# Patient Record
Sex: MALE | Race: WHITE | Employment: FULL TIME | URBAN - NONMETROPOLITAN AREA
[De-identification: names, ages, dates, MRNs, and addresses within clinical notes are randomized per-mention and may not be internally consistent; named-entity substitution may affect disease eponyms.]

---

## 2020-07-08 ENCOUNTER — OFFICE VISIT (OUTPATIENT)
Dept: PHYSICAL MEDICINE AND REHAB | Age: 65
End: 2020-07-08
Payer: MEDICARE

## 2020-07-08 VITALS
WEIGHT: 263 LBS | TEMPERATURE: 98.1 F | HEART RATE: 66 BPM | DIASTOLIC BLOOD PRESSURE: 78 MMHG | BODY MASS INDEX: 35.62 KG/M2 | SYSTOLIC BLOOD PRESSURE: 142 MMHG | HEIGHT: 72 IN

## 2020-07-08 PROCEDURE — G8417 CALC BMI ABV UP PARAM F/U: HCPCS | Performed by: PAIN MEDICINE

## 2020-07-08 PROCEDURE — G8427 DOCREV CUR MEDS BY ELIG CLIN: HCPCS | Performed by: PAIN MEDICINE

## 2020-07-08 PROCEDURE — 99203 OFFICE O/P NEW LOW 30 MIN: CPT | Performed by: PAIN MEDICINE

## 2020-07-08 RX ORDER — SILDENAFIL 100 MG/1
100 TABLET, FILM COATED ORAL DAILY PRN
COMMUNITY
Start: 2020-04-29

## 2020-07-08 RX ORDER — TAMSULOSIN HYDROCHLORIDE 0.4 MG/1
0.4 CAPSULE ORAL DAILY
COMMUNITY
Start: 2020-04-14

## 2020-07-08 RX ORDER — CELECOXIB 200 MG/1
CAPSULE ORAL
COMMUNITY
Start: 2020-05-01

## 2020-07-08 RX ORDER — TRAMADOL HYDROCHLORIDE 50 MG/1
50 TABLET ORAL EVERY 6 HOURS PRN
COMMUNITY
Start: 2020-04-14

## 2020-07-08 ASSESSMENT — ENCOUNTER SYMPTOMS
VOMITING: 0
COLOR CHANGE: 0
COUGH: 0
BACK PAIN: 1
SINUS PRESSURE: 0
CONSTIPATION: 0
RHINORRHEA: 0
NAUSEA: 0
BOWEL INCONTINENCE: 0
PHOTOPHOBIA: 0
EYE PAIN: 0
SHORTNESS OF BREATH: 0
CHEST TIGHTNESS: 0
DIARRHEA: 0
SORE THROAT: 0
ABDOMINAL PAIN: 0
WHEEZING: 0

## 2020-07-08 NOTE — PROGRESS NOTES
[facility]  [address]  Dept: [phone]  Dept Fax: [phone]: [phone]    Visit Date: 7/8/2020    Violeta Medeiros is a 71 y.o. male who is referred for pain management evaluation and treatment per Dr. Emily Costa and [facility] Questions   1. In the last three months, have you felt you should cut down or stop drinking or using drugs? Yes []        No [x]     2. In the last three months, has anyone annoyed you or gotten on your nerves by telling you to cut down or stop drinking or using drugs? Yes []        No [x]     3. In the last three months, have you felt guilty or bad about how much you drink or use drugs? Yes []        No [x]     4. In the last three months, have you been waking up wanting to have an alcoholic drink or use drugs? Yes []        No [x]        Opioid Risk Tool:  Clinician Form       1. Family History of Substance Abuse: Female Male    Alcohol   []1   []3    Illegal drugs   []2   []3    Prescription drugs     []4   []4   2. Personal History of Substance Abuse:          Alcohol   []3   []3    Illegal drugs   []4   []4    Prescription drugs     []5   []5   3. Age (valerie box if between 12 and 39):     []1   []1   4. History of Preadolescent Sexual Abuse:     []3   []0   5. Psychological Disease:      Attention deficit disorder, obsessive-compulsive disorder, bipolar, schizophrenia   []2   []2      Depression     []1   []1    Scoring Totals 0 0     Total Score  Low Risk  Moderate Risk  High Risk   Risk Category   0 - 3   4 - 7   8 or Above      Patient states symptoms interfere with:  A.  General Activity:  yes   B. Mood: yes    C. Walking Ability:   yes   D. Normal Work (Includes both work outside the home and housework):   yes    E.  Relations with Other People:  yes   F. Sleep:   yes   G.  Enjoyment of Life:  yes       HPI:     ChiefComplaint: Mid mouth every 6 hours as needed. , Disp: , Rfl:     Subjective:      Review of Systems   Constitutional: Positive for activity change. Negative for appetite change, chills, diaphoresis, fatigue, fever and unexpected weight change. HENT: Negative for congestion, ear pain, hearing loss, mouth sores, nosebleeds, rhinorrhea, sinus pressure and sore throat. Eyes: Negative for photophobia, pain and visual disturbance. Respiratory: Negative for cough, chest tightness, shortness of breath and wheezing. Cardiovascular: Negative for chest pain and palpitations. Gastrointestinal: Negative for abdominal pain, bowel incontinence, constipation, diarrhea, nausea and vomiting. Endocrine: Negative for cold intolerance, heat intolerance, polydipsia, polyphagia and polyuria. Genitourinary: Negative for bladder incontinence, decreased urine volume, difficulty urinating, frequency and hematuria. Musculoskeletal: Positive for arthralgias, back pain, joint swelling, myalgias and neck pain. Negative for gait problem and neck stiffness. Skin: Negative for color change and rash. Allergic/Immunologic: Negative for food allergies and immunocompromised state. Neurological: Negative for dizziness, tingling, tremors, seizures, syncope, facial asymmetry, speech difficulty, weakness, light-headedness, numbness and headaches. Hematological: Does not bruise/bleed easily. Psychiatric/Behavioral: Positive for sleep disturbance. Negative for agitation, behavioral problems, confusion, decreased concentration, dysphoric mood, hallucinations, self-injury and suicidal ideas. The patient is not nervous/anxious and is not hyperactive. Objective:     Vitals:    07/08/20 1508   BP: (!) 142/78   Site: Left Upper Arm   Position: Sitting   Cuff Size: Medium Adult   Pulse: 66   Temp: 98.1 °F (36.7 °C)   TempSrc: Temporal   Weight: 263 lb (119.3 kg)   Height: 6' (1.829 m)       Physical Exam  Vitals signs and nursing note reviewed. Constitutional:       General: He is not in acute distress. Appearance: He is well-developed. He is not diaphoretic. HENT:      Head: Normocephalic and atraumatic. Right Ear: External ear normal.      Left Ear: External ear normal.      Nose: Nose normal.      Mouth/Throat:      Pharynx: No oropharyngeal exudate. Eyes:      General: No scleral icterus. Right eye: No discharge. Left eye: No discharge. Conjunctiva/sclera: Conjunctivae normal.      Pupils: Pupils are equal, round, and reactive to light. Neck:      Musculoskeletal: Full passive range of motion without pain, normal range of motion and neck supple. Normal range of motion. No edema, erythema, neck rigidity or muscular tenderness. Thyroid: No thyromegaly. Cardiovascular:      Rate and Rhythm: Normal rate and regular rhythm. Heart sounds: Normal heart sounds. No murmur. No friction rub. No gallop. Pulmonary:      Effort: Pulmonary effort is normal. No respiratory distress. Breath sounds: Normal breath sounds. No wheezing or rales. Chest:      Chest wall: No tenderness. Abdominal:      General: Bowel sounds are normal. There is no distension. Palpations: Abdomen is soft. Tenderness: There is no abdominal tenderness. There is no guarding or rebound. Musculoskeletal:      Right shoulder: He exhibits no tenderness. Left shoulder: He exhibits no tenderness. Right hip: He exhibits no tenderness. Left hip: He exhibits no tenderness. Right knee: He exhibits normal range of motion. No tenderness found. Left knee: He exhibits normal range of motion. No tenderness found. Right ankle: He exhibits no swelling. Left ankle: He exhibits no swelling. Cervical back: He exhibits decreased range of motion, tenderness and pain. Thoracic back: He exhibits tenderness and pain. Lumbar back: He exhibits tenderness.         Back:       Right lower leg: He exhibits no swelling. Left lower leg: He exhibits no swelling. Right foot: No swelling. Left foot: No swelling. Skin:     General: Skin is warm. Coloration: Skin is not pale. Findings: No erythema or rash. Neurological:      Mental Status: He is alert and oriented to person, place, and time. He is not disoriented. Cranial Nerves: No cranial nerve deficit. Sensory: Sensation is intact. No sensory deficit. Motor: Motor function is intact. No atrophy or abnormal muscle tone. Coordination: Coordination is intact. Coordination normal.      Gait: Gait normal.      Deep Tendon Reflexes: Reflexes are normal and symmetric. Babinski sign absent on the right side. Babinski sign absent on the left side. Comments: SLR- NEG   Psychiatric:         Attention and Perception: Attention normal. He is attentive. Mood and Affect: Mood normal. Mood is not anxious or depressed. Affect is not labile, blunt, angry or inappropriate. Speech: Speech normal. He is communicative. Speech is not rapid and pressured, delayed, slurred or tangential.         Behavior: Behavior normal. Behavior is not agitated, slowed, aggressive, withdrawn, hyperactive or combative. Behavior is cooperative. Thought Content: Thought content normal. Thought content is not paranoid or delusional. Thought content does not include homicidal or suicidal ideation. Thought content does not include homicidal or suicidal plan. Cognition and Memory: Cognition normal. Memory is not impaired. He does not exhibit impaired recent memory or impaired remote memory. Judgment: Judgment normal. Judgment is not impulsive or inappropriate. JIMBO test: pos  Yeoman's test: pos  Gaenslen test: pos     Assessment:     1. Protrusion of thoracic intervertebral disc    2. Arthropathy of thoracic facet joint            Plan:      · Patient read and signed orientation and opioid agreement. · OARRS reviewed.

## 2020-07-09 ENCOUNTER — HOSPITAL ENCOUNTER (OUTPATIENT)
Age: 65
Discharge: HOME | End: 2020-07-09
Payer: MEDICARE

## 2020-07-09 PROCEDURE — U0002 COVID-19 LAB TEST NON-CDC: HCPCS

## 2020-07-10 LAB
PERFORMING LAB: NORMAL
REPORT: NORMAL
SARS-COV-2: NOT DETECTED

## 2020-07-13 ENCOUNTER — ANESTHESIA (OUTPATIENT)
Dept: OPERATING ROOM | Age: 65
End: 2020-07-13
Payer: MEDICARE

## 2020-07-13 ENCOUNTER — APPOINTMENT (OUTPATIENT)
Dept: GENERAL RADIOLOGY | Age: 65
End: 2020-07-13
Attending: PAIN MEDICINE
Payer: MEDICARE

## 2020-07-13 ENCOUNTER — HOSPITAL ENCOUNTER (OUTPATIENT)
Age: 65
Setting detail: OUTPATIENT SURGERY
Discharge: HOME | End: 2020-07-13
Attending: PAIN MEDICINE | Admitting: PAIN MEDICINE
Payer: MEDICARE

## 2020-07-13 ENCOUNTER — ANESTHESIA EVENT (OUTPATIENT)
Dept: OPERATING ROOM | Age: 65
End: 2020-07-13
Payer: MEDICARE

## 2020-07-13 VITALS
HEIGHT: 72 IN | BODY MASS INDEX: 29.93 KG/M2 | SYSTOLIC BLOOD PRESSURE: 146 MMHG | DIASTOLIC BLOOD PRESSURE: 82 MMHG | RESPIRATION RATE: 16 BRPM | TEMPERATURE: 97.7 F | OXYGEN SATURATION: 95 % | HEART RATE: 76 BPM | WEIGHT: 221 LBS

## 2020-07-13 VITALS
DIASTOLIC BLOOD PRESSURE: 75 MMHG | RESPIRATION RATE: 14 BRPM | SYSTOLIC BLOOD PRESSURE: 139 MMHG | OXYGEN SATURATION: 96 %

## 2020-07-13 PROCEDURE — 3700000000 HC ANESTHESIA ATTENDED CARE: Performed by: PAIN MEDICINE

## 2020-07-13 PROCEDURE — 62321 NJX INTERLAMINAR CRV/THRC: CPT | Performed by: PAIN MEDICINE

## 2020-07-13 PROCEDURE — 6360000004 HC RX CONTRAST MEDICATION: Performed by: PAIN MEDICINE

## 2020-07-13 PROCEDURE — 2500000003 HC RX 250 WO HCPCS: Performed by: NURSE ANESTHETIST, CERTIFIED REGISTERED

## 2020-07-13 PROCEDURE — 2500000003 HC RX 250 WO HCPCS: Performed by: PAIN MEDICINE

## 2020-07-13 PROCEDURE — 6360000002 HC RX W HCPCS: Performed by: NURSE ANESTHETIST, CERTIFIED REGISTERED

## 2020-07-13 PROCEDURE — 3600000054 HC PAIN LEVEL 3 BASE: Performed by: PAIN MEDICINE

## 2020-07-13 PROCEDURE — 3209999900 FLUORO FOR SURGICAL PROCEDURES

## 2020-07-13 PROCEDURE — 2580000003 HC RX 258: Performed by: PAIN MEDICINE

## 2020-07-13 PROCEDURE — 6360000002 HC RX W HCPCS: Performed by: PAIN MEDICINE

## 2020-07-13 PROCEDURE — 7100000010 HC PHASE II RECOVERY - FIRST 15 MIN: Performed by: PAIN MEDICINE

## 2020-07-13 PROCEDURE — 7100000011 HC PHASE II RECOVERY - ADDTL 15 MIN: Performed by: PAIN MEDICINE

## 2020-07-13 RX ORDER — PROPOFOL 10 MG/ML
INJECTION, EMULSION INTRAVENOUS PRN
Status: DISCONTINUED | OUTPATIENT
Start: 2020-07-13 | End: 2020-07-13 | Stop reason: SDUPTHER

## 2020-07-13 RX ORDER — DEXAMETHASONE SODIUM PHOSPHATE 4 MG/ML
INJECTION, SOLUTION INTRA-ARTICULAR; INTRALESIONAL; INTRAMUSCULAR; INTRAVENOUS; SOFT TISSUE PRN
Status: DISCONTINUED | OUTPATIENT
Start: 2020-07-13 | End: 2020-07-13 | Stop reason: ALTCHOICE

## 2020-07-13 RX ORDER — SODIUM CHLORIDE 9 MG/ML
INJECTION INTRAVENOUS PRN
Status: DISCONTINUED | OUTPATIENT
Start: 2020-07-13 | End: 2020-07-13 | Stop reason: ALTCHOICE

## 2020-07-13 RX ORDER — LIDOCAINE HYDROCHLORIDE 10 MG/ML
INJECTION, SOLUTION INFILTRATION; PERINEURAL PRN
Status: DISCONTINUED | OUTPATIENT
Start: 2020-07-13 | End: 2020-07-13 | Stop reason: ALTCHOICE

## 2020-07-13 RX ORDER — LIDOCAINE HYDROCHLORIDE 20 MG/ML
INJECTION, SOLUTION EPIDURAL; INFILTRATION; INTRACAUDAL; PERINEURAL PRN
Status: DISCONTINUED | OUTPATIENT
Start: 2020-07-13 | End: 2020-07-13 | Stop reason: SDUPTHER

## 2020-07-13 RX ADMIN — PROPOFOL 120 MG: 10 INJECTION, EMULSION INTRAVENOUS at 11:31

## 2020-07-13 RX ADMIN — LIDOCAINE HYDROCHLORIDE 60 MG: 20 INJECTION, SOLUTION EPIDURAL; INFILTRATION; INTRACAUDAL; PERINEURAL at 11:31

## 2020-07-13 ASSESSMENT — PAIN SCALES - GENERAL: PAINLEVEL_OUTOF10: 0

## 2020-07-13 ASSESSMENT — PULMONARY FUNCTION TESTS
PIF_VALUE: 0
PIF_VALUE: 1
PIF_VALUE: 0

## 2020-07-13 ASSESSMENT — PAIN - FUNCTIONAL ASSESSMENT: PAIN_FUNCTIONAL_ASSESSMENT: 0-10

## 2020-07-13 ASSESSMENT — PAIN DESCRIPTION - DESCRIPTORS: DESCRIPTORS: CONSTANT;BURNING

## 2020-07-13 NOTE — OP NOTE
Operative Note    Pre-Procedure Note    Patient Name: Fermin Rebolledo   YOB: 1955  Medical Record Number: [id number]  Date: 7/13/20       Indication: Thoracic pain  Consent: On file. Vital Signs:   Vitals:    07/13/20 1046   BP: (!) 150/77   Pulse: 61   Resp: 15   Temp: 97.4 °F (36.3 °C)   SpO2: 97%       Past Medical History:   has a past medical history of Headache(784.0) and Osteoarthritis. Past Surgical History:   has a past surgical history that includes Neck surgery (2010); shoulder surgery (2010); hernia repair (2007); Tonsillectomy; Colonoscopy; other surgical history; and Hemorrhoid surgery (5/16/16). Pre-Sedation Documentation and Exam:   Vital signs have been reviewed (see flow sheet for vitals). Sedation/Anesthesia Plan:   MAC    Medications Planned:   Per Anesthesia. Patient is an appropriate candidate for plan of sedation: yes    Preoperative Diagnosis:  T-HNP    Post-Op Dx: as above    Procedure Performed:  Thoracic epidural steroid injection under fluoroscopy guidance # 1. Indication for the Procedure: The patient failed conservative management  for pain in the low back radiating to lower extremities. The patient is undergoing lumbar epidural steroid injection,  As the patient is not responding to conservative management and it is interfering with activities of daily living we decided to proceed with lumbar epidural steroid injection. The procedure and risks were discussed with the patient and an informed consent was obtained    Procedure: The patient is placed in prone position. Skin over the back was prepped and draped in sterile manner. Then using fluoroscopy the T-4 interspace was observed and the skin and deep tissues in the  paramedian area were infiltrated with 5 ml of 1% lidocaine. The #20-gauge, 3-1/2 inch Tuohy needle was inserted through the skin wheal and the epidural space was identified using loss of resistance technique .  This was confirmed with AP and lateral views using fluoroscopy after injecting about 2 ml of Omnipaque-180 and observing the spread of the contrast in the epidural space. Then after negative aspiration a total of 12 mg of dexamethasone with 3 ml of normal saline was injected into the epidural space. The needle is removed and a Band-Aid was placed over the needle insertion site. No paresthesia. EBL-0  Patient's vital signs remained stable and the patient tolerated the procedure well. The patient will be discharged home in stable condition and will be followed in the office in the next few weeks for further planning.     Electronically signed by Radha Jerome MD on 7/13/20 at 11:43 AM EDT

## 2020-07-13 NOTE — PROGRESS NOTES
1139 To phase 2 recovery via cart. Report received from [employer]. Skin is warm and dry. Respirations are easy & non-labored. A & O x 3. Denies pain. Injection site clean, dry and intact without edema noted. Bandaid intact. Pedal push/pull and bilateral leg raise are equal and strong. Moving all 4 extremities without difficulty. 1145 Given diet pepsi per request. Updated on plan of care and voiced understanding. Call light in reach. 1200 Drinking without difficulty. IV removed and bandaid applied. Skin is warm and dry. Respirations are easy & non-labored. 1205 To private vehicle x 1 assist via ambulation. Gait steady.

## 2020-07-13 NOTE — ANESTHESIA POSTPROCEDURE EVALUATION
Department of Anesthesiology  Postprocedure Note    Patient: Angelica Liriano  MRN: [id number]  YOB: 1955  Date of evaluation: 7/13/2020  Time:  12:41 PM     Procedure Summary     Date:  07/13/20 Room / Location:  [address] [place] [address] 03 / [facility]    Anesthesia Start:  1127 Anesthesia Stop:  1138    Procedure:  Thoracic epidural injection @ T4 (N/A ) Diagnosis:  (Protrusion of thoracic intervertebral disc)    Surgeon:  Amena Donnelly MD Responsible Provider:  Tio Velez MD    Anesthesia Type:  MAC ASA Status:  2          Anesthesia Type: MAC    Adria Phase I: Adria Score: 10    Adria Phase II: Adria Score: 10    Last vitals: Reviewed and per EMR flowsheets.        Anesthesia Post Evaluation

## 2020-07-13 NOTE — H&P
H & P    Back Pain   Chronicity: Patient is 70 y/o male comes with back pain last three weeks. States was working while on tractor or was kicked by a horse. The problem has been rapidly worsening since onset. The pain is present in the thoracic spine. The quality of the pain is described as stabbing. The pain does not radiate. Pain scale: Pain scale at worse is a 8/10 and and at best is a 4/10. The pain is worse during the night. The symptoms are aggravated by position, lying down and bending (walking. States pain better with Tramadol and Standing. ). Pertinent negatives include no abdominal pain, bladder incontinence, bowel incontinence, chest pain, fever, headaches, numbness, tingling or weakness. Risk factors include history of cancer (Melanoma back. ). He has tried analgesics for the symptoms. The treatment provided moderate relief. MRI THORACIC SPINE  06/22/2020:               The patient has No Known Allergies.     PastMedical History  Teddy Mansfield  has a past medical history of Headache(784.0) and Osteoarthritis.     Past Surgical History  The patient  has a past surgical history that includes Neck surgery (2010); shoulder surgery (2010); hernia repair (2007); Tonsillectomy; Colonoscopy; other surgical history; Hemorrhoid surgery (5/16/16); and Cervical spine surgery (N/A, 7/13/2020).    Family History  This patient's family history includes Asthma in his brother and father; Hearing Loss in his father.  Delilah Glover  reports that he has never smoked.  He has never used smokeless tobacco. He reports that he does not drink alcohol or use drugs.     Medications  Current Medication     Current Outpatient Medications:     celecoxib (CELEBREX) 200 MG capsule, TAKE 1 CAPSULE BY MOUTH TWICE A DAY, Disp: , Rfl:     sildenafil (VIAGRA) 100 MG tablet, Take 100 mg by mouth daily as needed, Disp: , Rfl:     tamsulosin (FLOMAX) 0.4 MG capsule, Take 0.4 mg by mouth daily, Disp: , Rfl:     traMADol (ULTRAM) 50 MG tablet, Take 50 mg by mouth every 6 hours as needed. , Disp: , Rfl:         Subjective:      Review of Systems   Constitutional: Positive for activity change. Negative for appetite change, chills, diaphoresis, fatigue, fever and unexpected weight change. HENT: Negative for congestion, ear pain, hearing loss, mouth sores, nosebleeds, rhinorrhea, sinus pressure and sore throat. Eyes: Negative for photophobia, pain and visual disturbance. Respiratory: Negative for cough, chest tightness, shortness of breath and wheezing. Cardiovascular: Negative for chest pain and palpitations. Gastrointestinal: Negative for abdominal pain, bowel incontinence, constipation, diarrhea, nausea and vomiting. Endocrine: Negative for cold intolerance, heat intolerance, polydipsia, polyphagia and polyuria. Genitourinary: Negative for bladder incontinence, decreased urine volume, difficulty urinating, frequency and hematuria. Musculoskeletal: Positive for arthralgias, back pain, joint swelling, myalgias and neck pain. Negative for gait problem and neck stiffness. Skin: Negative for color change and rash. Allergic/Immunologic: Negative for food allergies and immunocompromised state. Neurological: Negative for dizziness, tingling, tremors, seizures, syncope, facial asymmetry, speech difficulty, weakness, light-headedness, numbness and headaches. Hematological: Does not bruise/bleed easily. Psychiatric/Behavioral: Positive for sleep disturbance. Negative for agitation, behavioral problems, confusion, decreased concentration, dysphoric mood, hallucinations, self-injury and suicidal ideas.  The patient is not nervous/anxious and is not hyperactive.          Objective:      Vitals       Vitals:     07/08/20 1508   BP: (!) 142/78   Site: Left Upper Arm   Position: Sitting   Cuff Size: Medium Adult   Pulse: 66   Temp: 98.1 °F (36.7 °C)   TempSrc: Temporal   Weight: 263 lb (119.3 kg)   Height: 6' (1.829 m)           Physical orientation and opioid agreement. · OARRS reviewed. Current MED: 0  · Patient was not offered naloxone for home. · Discussed long term side effects of medications, tolerance, dependency and addiction. · UDS NOTpreformed today. · Patient told can not receive any pain medications from any other source. · No evidence of abuse, diversion or aberrant behavior. · Prescription Needs: No prescription pain medications at this time  · Medications and/or procedures to improve function and quality of life- patient understanding with this and that may not be pain free  · Discussed possible weaning of medication dosing dependent on treatment/procedure results. · Discussed with patient about safe storage of medications at home  · Testing: Reviewed MRI Thoracic Spine with patient. · Procedures: Discussed Thoracic ELDER @ T4/5  · Discussed with patient about risks with procedure including infection, reaction to medication, increased pain, or bleeding. · Medications:Reviewed.        Previous Treatments tried:  · PT: No,    · NSAIDs: Yes,  any benefit? Yes,  how long taken: TAKES  · Chiropractic: No,  any benefit? No  · Muscle relaxants: No,  any benefit? No  · Narcotics: No,  any benefit? No  · Spine surgeon consult: No  · Any Implants: Yes     Meds. Prescribed:     Encounter Medications    No orders of the defined types were placed in this encounter.        Return Thoracic epidural injection @ T4/5 or other level.
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
